# Patient Record
Sex: FEMALE | Race: OTHER | URBAN - METROPOLITAN AREA
[De-identification: names, ages, dates, MRNs, and addresses within clinical notes are randomized per-mention and may not be internally consistent; named-entity substitution may affect disease eponyms.]

---
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
## 2024-01-12 LAB — RPR, EXTERNAL RESULT: NON REACTIVE

## 2024-02-28 LAB — GBS, EXTERNAL RESULT: NEGATIVE

## 2024-03-01 ENCOUNTER — ROUTINE PRENATAL (OUTPATIENT)
Age: 19
End: 2024-03-01
Payer: MEDICAID

## 2024-03-01 VITALS — HEART RATE: 77 BPM | DIASTOLIC BLOOD PRESSURE: 71 MMHG | SYSTOLIC BLOOD PRESSURE: 123 MMHG

## 2024-03-01 DIAGNOSIS — O09.899 HIGH RISK TEEN PREGNANCY, ANTEPARTUM: Primary | ICD-10-CM

## 2024-03-01 PROCEDURE — 99212 OFFICE O/P EST SF 10 MIN: CPT | Performed by: OBSTETRICS & GYNECOLOGY

## 2024-03-01 PROCEDURE — 76816 OB US FOLLOW-UP PER FETUS: CPT | Performed by: OBSTETRICS & GYNECOLOGY

## 2024-03-01 NOTE — PROCEDURES
PATIENT: WON GEIGERYSANDRA   -  : 2005   -  DOS:2024   -  INTERPRETING PROVIDER:Delisa Gaston,   Indication  ========     Screening    Method  ======    Transabdominal ultrasound examination. View: Sufficient    Pregnancy  =========    White pregnancy. Number of fetuses: 1    Dating  ======    LMP on: 2023  Cycle: regular cycle  GA by LMP 36 w + 6 d  DAVID by LMP: 3/23/2024  Ultrasound examination on: 3/1/2024  GA by U/S based upon: AC, BPD, Femur, HC  GA by U/S 36 w + 4 d  DAVID by U/S: 3/25/2024  Assigned: based on the LMP, selected on 2023  Assigned GA 36 w + 6 d  Assigned DAVID: 3/23/2024    Fetal Biometry  ============    Standard  BPD 88.2 mm 35w 5d 30% Hadlock  .4 mm 37w 3d 56% Eun  .2 mm 35w 6d 7% Hadlock  .0 mm 38w 1d 91% Hadlock  Femur 70.9 mm 36w 2d 35% Hadlock  Humerus 59.7 mm 34w 4d 20% Eun  EFW 3,141 g 37w 4d 64% Hadlock  EFW (lb) 6 lb  EFW (oz) 15 oz  EFW by: Hadlock (BPD-HC-AC-FL)  Head / Face / Neck  Nasal bone: present  Other Structures   bpm    General Evaluation  ==============    Cardiac activity present.  bpm. Fetal movements: visualized. Presentation: Cephalic  Placenta: Placental site: anterior, appropriate distance from the internal os  Umbilical cord: Cord vessels: 3 vessel cord. Insertion site: placental insertion normal  Amniotic fluid: Amount of AF: normal. MVP 5.6 cm. ADELE 12.5 cm. Q1 4.6 cm, Q2 2.3 cm, Q3 0.0 cm, Q4 5.6 cm    Fetal Anatomy  ===========    Face  Nasal bone: present  Heart / Thorax  Cardiac rhythm: regular (normal)  Diaphragm: normal  Stomach: normal  Kidneys: normal  Bladder: normal  Wants to know fetal sex: yes    Findings  =======    Viable White pregnancy at 36w 6d by clinical dates.  EFW is 3141 g at 64%, abdominal circumference at 91%.  Anatomy visualized as stated above.  Amniotic fluid is normal.  Placenta is anterior, appropriate distance from the internal os.  Cephalic

## 2024-03-20 ENCOUNTER — HOSPITAL ENCOUNTER (INPATIENT)
Facility: HOSPITAL | Age: 19
LOS: 3 days | Discharge: HOME | DRG: 560 | End: 2024-03-23
Attending: OBSTETRICS & GYNECOLOGY | Admitting: OBSTETRICS & GYNECOLOGY
Payer: COMMERCIAL

## 2024-03-20 ENCOUNTER — HOSPITAL ENCOUNTER (EMERGENCY)
Facility: HOSPITAL | Age: 19
Discharge: HOME | DRG: 560 | End: 2024-03-20
Payer: COMMERCIAL

## 2024-03-20 VITALS
DIASTOLIC BLOOD PRESSURE: 83 MMHG | TEMPERATURE: 98.3 F | HEART RATE: 87 BPM | RESPIRATION RATE: 18 BRPM | SYSTOLIC BLOOD PRESSURE: 133 MMHG

## 2024-03-20 LAB
ERYTHROCYTE [DISTWIDTH] IN BLOOD BY AUTOMATED COUNT: 13.1 % (ref 11.5–14.5)
HCT VFR BLD AUTO: 42.3 % (ref 35–47)
HGB BLD-MCNC: 14.3 G/DL (ref 11.5–16)
MCH RBC QN AUTO: 30.5 PG (ref 26–34)
MCHC RBC AUTO-ENTMCNC: 33.8 G/DL (ref 30–36.5)
MCV RBC AUTO: 90.2 FL (ref 80–99)
NRBC # BLD: 0 K/UL (ref 0–0.01)
NRBC BLD-RTO: 0 PER 100 WBC
PLATELET # BLD AUTO: 234 K/UL (ref 150–400)
PMV BLD AUTO: 11.2 FL (ref 8.9–12.9)
RBC # BLD AUTO: 4.69 M/UL (ref 3.8–5.2)
WBC # BLD AUTO: 12.1 K/UL (ref 3.6–11)

## 2024-03-20 PROCEDURE — 4500000002 HC ER NO CHARGE

## 2024-03-20 PROCEDURE — 86900 BLOOD TYPING SEROLOGIC ABO: CPT

## 2024-03-20 PROCEDURE — 86850 RBC ANTIBODY SCREEN: CPT

## 2024-03-20 PROCEDURE — 36415 COLL VENOUS BLD VENIPUNCTURE: CPT

## 2024-03-20 PROCEDURE — 85027 COMPLETE CBC AUTOMATED: CPT

## 2024-03-20 PROCEDURE — 86901 BLOOD TYPING SEROLOGIC RH(D): CPT

## 2024-03-20 PROCEDURE — 7210000100 HC LABOR FEE PER 1 HR

## 2024-03-20 PROCEDURE — 1100000000 HC RM PRIVATE

## 2024-03-20 PROCEDURE — 99283 EMERGENCY DEPT VISIT LOW MDM: CPT

## 2024-03-20 RX ORDER — SODIUM CHLORIDE 9 MG/ML
25 INJECTION, SOLUTION INTRAVENOUS PRN
Status: DISCONTINUED | OUTPATIENT
Start: 2024-03-20 | End: 2024-03-23 | Stop reason: HOSPADM

## 2024-03-20 RX ORDER — SODIUM CHLORIDE, SODIUM LACTATE, POTASSIUM CHLORIDE, AND CALCIUM CHLORIDE .6; .31; .03; .02 G/100ML; G/100ML; G/100ML; G/100ML
1000 INJECTION, SOLUTION INTRAVENOUS PRN
Status: DISCONTINUED | OUTPATIENT
Start: 2024-03-20 | End: 2024-03-23 | Stop reason: HOSPADM

## 2024-03-20 RX ORDER — TERBUTALINE SULFATE 1 MG/ML
0.25 INJECTION, SOLUTION SUBCUTANEOUS
Status: ACTIVE | OUTPATIENT
Start: 2024-03-20 | End: 2024-03-21

## 2024-03-20 RX ORDER — NALOXONE HYDROCHLORIDE 0.4 MG/ML
INJECTION, SOLUTION INTRAMUSCULAR; INTRAVENOUS; SUBCUTANEOUS PRN
Status: DISCONTINUED | OUTPATIENT
Start: 2024-03-20 | End: 2024-03-23 | Stop reason: HOSPADM

## 2024-03-20 RX ORDER — SODIUM CHLORIDE, SODIUM LACTATE, POTASSIUM CHLORIDE, AND CALCIUM CHLORIDE .6; .31; .03; .02 G/100ML; G/100ML; G/100ML; G/100ML
500 INJECTION, SOLUTION INTRAVENOUS PRN
Status: DISCONTINUED | OUTPATIENT
Start: 2024-03-20 | End: 2024-03-23 | Stop reason: HOSPADM

## 2024-03-20 RX ORDER — FENTANYL/BUPIVACAINE/NS/PF 2-1250MCG
1-15 PLASTIC BAG, INJECTION (ML) INJECTION CONTINUOUS
Status: DISCONTINUED | OUTPATIENT
Start: 2024-03-20 | End: 2024-03-23 | Stop reason: HOSPADM

## 2024-03-20 RX ORDER — EPHEDRINE SULFATE 50 MG/ML
10 INJECTION INTRAVENOUS
Status: DISPENSED | OUTPATIENT
Start: 2024-03-20 | End: 2024-03-21

## 2024-03-20 RX ORDER — DIPHENHYDRAMINE HCL 25 MG
25 CAPSULE ORAL EVERY 4 HOURS PRN
Status: DISCONTINUED | OUTPATIENT
Start: 2024-03-20 | End: 2024-03-23 | Stop reason: HOSPADM

## 2024-03-20 RX ORDER — ACETAMINOPHEN 325 MG/1
650 TABLET ORAL EVERY 4 HOURS PRN
Status: DISCONTINUED | OUTPATIENT
Start: 2024-03-20 | End: 2024-03-23 | Stop reason: HOSPADM

## 2024-03-20 RX ORDER — ONDANSETRON 2 MG/ML
4 INJECTION INTRAMUSCULAR; INTRAVENOUS EVERY 6 HOURS PRN
Status: DISCONTINUED | OUTPATIENT
Start: 2024-03-20 | End: 2024-03-23 | Stop reason: HOSPADM

## 2024-03-20 RX ORDER — SODIUM CHLORIDE 0.9 % (FLUSH) 0.9 %
5-40 SYRINGE (ML) INJECTION PRN
Status: DISCONTINUED | OUTPATIENT
Start: 2024-03-20 | End: 2024-03-23 | Stop reason: HOSPADM

## 2024-03-20 RX ORDER — HYDROMORPHONE HYDROCHLORIDE 1 MG/ML
1 INJECTION, SOLUTION INTRAMUSCULAR; INTRAVENOUS; SUBCUTANEOUS
Status: DISCONTINUED | OUTPATIENT
Start: 2024-03-20 | End: 2024-03-23 | Stop reason: HOSPADM

## 2024-03-20 RX ORDER — SODIUM CHLORIDE 0.9 % (FLUSH) 0.9 %
5-40 SYRINGE (ML) INJECTION EVERY 12 HOURS SCHEDULED
Status: DISCONTINUED | OUTPATIENT
Start: 2024-03-20 | End: 2024-03-23 | Stop reason: HOSPADM

## 2024-03-20 NOTE — PROGRESS NOTES
3/20/2024  1:14 AM Patient arrived via wheelchair with report of contractions on and off since Sunday that have been stronger since 2230 on 3/19 and are now 10-15 minutes apart. Patient endorses positive fetal movement, denies leaking of fluid or vaginal bleeding. Patient denies pregnancy complications- is a patient of [facility] clinic and did not bring records.    0120: ENDY Orozco CNM at bedside SVE 2cm, plan for discharge. Labor precautions reviewed, SROM, vaginal bleeding, and decreased fetal movement. All questions answered. Patient has a follow up appointment today at [facility]. Patient encouraged to bring records the next time she comes to the hospital.   0148: Reviewed discharge instructions. Patient discharged home

## 2024-03-20 NOTE — ED PROVIDER NOTES
rhythm, normal S1 and S2, no S3 or S4, and no murmur noted  Abdomen:  No scars, normal bowel sounds, soft, non-distended, non-tender, no masses palpated, no hepatosplenomegally, gravid, soft to palpation  Fetal heart rate:  Baseline Heart Rate 125, accelerations:  present  long term variability:  moderate  decelerations:  absent  Reactive, cat 1    Cervix:    DILATION:  2 cm  EFFACEMENT:   75%  STATION:  -3 cm  CONSISTENCY:  medium  POSITION:  posterior  Vertex    Contraction frequency:  5-8 minutes, mild to palpation, resting tone soft  Membranes:  Intact    Clinical Impression:    Uterine contractions  Latent verses early labor  Pregnancy at 39w4d    Plan:    Admit to [facility]  NST  SVE    Discussed ctx pattern as ctx still spaced. Discussed returning to [facility] when contractions are every 5 minutes and more intense. Pt agreeable. Also reviewed LOF, VB and fetal movement precautions.   Pt has an appointment with [facility] today. Encouraged to keep appt and obtain prenatal records to bring to hospital when she returns in labor. Pt verbalized understanding.   Return to [facility] PRN  Discharged home

## 2024-03-21 LAB
ABO + RH BLD: NORMAL
BASOPHILS # BLD: 0 K/UL (ref 0–0.1)
BASOPHILS NFR BLD: 0 % (ref 0–1)
BLOOD GROUP ANTIBODIES SERPL: NORMAL
DIFFERENTIAL METHOD BLD: ABNORMAL
EOSINOPHIL # BLD: 0 K/UL (ref 0–0.4)
EOSINOPHIL NFR BLD: 0 % (ref 0–7)
ERYTHROCYTE [DISTWIDTH] IN BLOOD BY AUTOMATED COUNT: 13 % (ref 11.5–14.5)
HCT VFR BLD AUTO: 24.1 % (ref 35–47)
HGB BLD-MCNC: 8.5 G/DL (ref 11.5–16)
IMM GRANULOCYTES # BLD AUTO: 0.1 K/UL (ref 0–0.04)
IMM GRANULOCYTES NFR BLD AUTO: 1 % (ref 0–0.5)
LYMPHOCYTES # BLD: 1.2 K/UL (ref 0.8–3.5)
LYMPHOCYTES NFR BLD: 6 % (ref 12–49)
MCH RBC QN AUTO: 31.3 PG (ref 26–34)
MCHC RBC AUTO-ENTMCNC: 35.3 G/DL (ref 30–36.5)
MCV RBC AUTO: 88.6 FL (ref 80–99)
MONOCYTES # BLD: 1 K/UL (ref 0–1)
MONOCYTES NFR BLD: 5 % (ref 5–13)
NEUTS SEG # BLD: 18.5 K/UL (ref 1.8–8)
NEUTS SEG NFR BLD: 89 % (ref 32–75)
NRBC # BLD: 0 K/UL (ref 0–0.01)
NRBC BLD-RTO: 0 PER 100 WBC
PLATELET # BLD AUTO: 173 K/UL (ref 150–400)
PMV BLD AUTO: 10.5 FL (ref 8.9–12.9)
RBC # BLD AUTO: 2.72 M/UL (ref 3.8–5.2)
SPECIMEN EXP DATE BLD: NORMAL
WBC # BLD AUTO: 20.8 K/UL (ref 3.6–11)

## 2024-03-21 PROCEDURE — 2580000003 HC RX 258: Performed by: OBSTETRICS & GYNECOLOGY

## 2024-03-21 PROCEDURE — 2580000003 HC RX 258: Performed by: MIDWIFE

## 2024-03-21 PROCEDURE — 7220000101 HC DELIVERY VAGINAL/SINGLE

## 2024-03-21 PROCEDURE — 6370000000 HC RX 637 (ALT 250 FOR IP): Performed by: MIDWIFE

## 2024-03-21 PROCEDURE — 2500000003 HC RX 250 WO HCPCS: Performed by: MIDWIFE

## 2024-03-21 PROCEDURE — 7210000100 HC LABOR FEE PER 1 HR

## 2024-03-21 PROCEDURE — 6360000002 HC RX W HCPCS: Performed by: OBSTETRICS & GYNECOLOGY

## 2024-03-21 PROCEDURE — 6360000002 HC RX W HCPCS

## 2024-03-21 PROCEDURE — 88307 TISSUE EXAM BY PATHOLOGIST: CPT

## 2024-03-21 PROCEDURE — 10D17Z9 MANUAL EXTRACTION OF PRODUCTS OF CONCEPTION, RETAINED, VIA NATURAL OR ARTIFICIAL OPENING: ICD-10-PCS | Performed by: OBSTETRICS & GYNECOLOGY

## 2024-03-21 PROCEDURE — 0KQM0ZZ REPAIR PERINEUM MUSCLE, OPEN APPROACH: ICD-10-PCS | Performed by: OBSTETRICS & GYNECOLOGY

## 2024-03-21 PROCEDURE — 36415 COLL VENOUS BLD VENIPUNCTURE: CPT

## 2024-03-21 PROCEDURE — 85025 COMPLETE CBC W/AUTO DIFF WBC: CPT

## 2024-03-21 PROCEDURE — 1120000000 HC RM PRIVATE OB

## 2024-03-21 RX ORDER — FENTANYL CITRATE 50 UG/ML
INJECTION, SOLUTION INTRAMUSCULAR; INTRAVENOUS
Status: COMPLETED
Start: 2024-03-21 | End: 2024-03-21

## 2024-03-21 RX ORDER — SODIUM CHLORIDE, SODIUM LACTATE, POTASSIUM CHLORIDE, CALCIUM CHLORIDE 600; 310; 30; 20 MG/100ML; MG/100ML; MG/100ML; MG/100ML
INJECTION, SOLUTION INTRAVENOUS CONTINUOUS
Status: DISCONTINUED | OUTPATIENT
Start: 2024-03-21 | End: 2024-03-23 | Stop reason: HOSPADM

## 2024-03-21 RX ORDER — MISOPROSTOL 200 UG/1
TABLET ORAL
Status: DISPENSED
Start: 2024-03-21 | End: 2024-03-22

## 2024-03-21 RX ORDER — IBUPROFEN 400 MG/1
800 TABLET ORAL EVERY 8 HOURS
Status: CANCELLED | OUTPATIENT
Start: 2024-03-22

## 2024-03-21 RX ORDER — CARBOPROST TROMETHAMINE 250 UG/ML
250 INJECTION, SOLUTION INTRAMUSCULAR PRN
Status: DISCONTINUED | OUTPATIENT
Start: 2024-03-21 | End: 2024-03-23 | Stop reason: HOSPADM

## 2024-03-21 RX ORDER — METHYLERGONOVINE MALEATE 0.2 MG/ML
200 INJECTION INTRAVENOUS PRN
Status: DISCONTINUED | OUTPATIENT
Start: 2024-03-21 | End: 2024-03-23 | Stop reason: HOSPADM

## 2024-03-21 RX ORDER — SODIUM CHLORIDE 0.9 % (FLUSH) 0.9 %
5-40 SYRINGE (ML) INJECTION PRN
Status: CANCELLED | OUTPATIENT
Start: 2024-03-21

## 2024-03-21 RX ORDER — SODIUM CHLORIDE 0.9 % (FLUSH) 0.9 %
5-40 SYRINGE (ML) INJECTION EVERY 12 HOURS SCHEDULED
Status: CANCELLED | OUTPATIENT
Start: 2024-03-21

## 2024-03-21 RX ORDER — KETOROLAC TROMETHAMINE 30 MG/ML
30 INJECTION, SOLUTION INTRAMUSCULAR; INTRAVENOUS EVERY 6 HOURS
Status: CANCELLED | OUTPATIENT
Start: 2024-03-21 | End: 2024-03-22

## 2024-03-21 RX ORDER — OXYCODONE HYDROCHLORIDE 5 MG/1
10 TABLET ORAL EVERY 4 HOURS PRN
Status: CANCELLED | OUTPATIENT
Start: 2024-03-21

## 2024-03-21 RX ORDER — SODIUM CHLORIDE 9 MG/ML
INJECTION, SOLUTION INTRAVENOUS PRN
Status: CANCELLED | OUTPATIENT
Start: 2024-03-21

## 2024-03-21 RX ORDER — ACETAMINOPHEN 500 MG
1000 TABLET ORAL EVERY 8 HOURS SCHEDULED
Status: CANCELLED | OUTPATIENT
Start: 2024-03-21

## 2024-03-21 RX ORDER — ONDANSETRON 4 MG/1
4 TABLET, ORALLY DISINTEGRATING ORAL EVERY 6 HOURS PRN
Status: DISCONTINUED | OUTPATIENT
Start: 2024-03-21 | End: 2024-03-23 | Stop reason: HOSPADM

## 2024-03-21 RX ORDER — TRANEXAMIC ACID 100 MG/ML
INJECTION, SOLUTION INTRAVENOUS
Status: DISPENSED
Start: 2024-03-21 | End: 2024-03-22

## 2024-03-21 RX ORDER — MODIFIED LANOLIN
OINTMENT (GRAM) TOPICAL PRN
Status: CANCELLED | OUTPATIENT
Start: 2024-03-21

## 2024-03-21 RX ORDER — ONDANSETRON 2 MG/ML
4 INJECTION INTRAMUSCULAR; INTRAVENOUS EVERY 6 HOURS PRN
Status: DISCONTINUED | OUTPATIENT
Start: 2024-03-21 | End: 2024-03-21 | Stop reason: SDUPTHER

## 2024-03-21 RX ORDER — OXYCODONE HYDROCHLORIDE 5 MG/1
5 TABLET ORAL EVERY 4 HOURS PRN
Status: CANCELLED | OUTPATIENT
Start: 2024-03-21

## 2024-03-21 RX ORDER — FENTANYL CITRATE 50 UG/ML
100 INJECTION, SOLUTION INTRAMUSCULAR; INTRAVENOUS ONCE
Status: COMPLETED | OUTPATIENT
Start: 2024-03-21 | End: 2024-03-21

## 2024-03-21 RX ORDER — IBUPROFEN 400 MG/1
800 TABLET ORAL EVERY 8 HOURS SCHEDULED
Status: CANCELLED | OUTPATIENT
Start: 2024-03-21 | End: 2024-03-24

## 2024-03-21 RX ORDER — MISOPROSTOL 200 UG/1
400 TABLET ORAL PRN
Status: DISCONTINUED | OUTPATIENT
Start: 2024-03-21 | End: 2024-03-23 | Stop reason: HOSPADM

## 2024-03-21 RX ORDER — LIDOCAINE HYDROCHLORIDE 10 MG/ML
INJECTION, SOLUTION INFILTRATION; PERINEURAL
Status: DISPENSED
Start: 2024-03-21 | End: 2024-03-22

## 2024-03-21 RX ADMIN — WATER 2000 MG: 1 INJECTION INTRAMUSCULAR; INTRAVENOUS; SUBCUTANEOUS at 23:01

## 2024-03-21 RX ADMIN — WATER 2000 MG: 1 INJECTION INTRAMUSCULAR; INTRAVENOUS; SUBCUTANEOUS at 14:30

## 2024-03-21 RX ADMIN — TRANEXAMIC ACID 1000 MG: 100 INJECTION, SOLUTION INTRAVENOUS at 13:48

## 2024-03-21 RX ADMIN — SODIUM CHLORIDE 25 ML: 9 INJECTION, SOLUTION INTRAVENOUS at 21:14

## 2024-03-21 RX ADMIN — GENTAMICIN SULFATE 300 MG: 40 INJECTION, SOLUTION INTRAMUSCULAR; INTRAVENOUS at 21:13

## 2024-03-21 RX ADMIN — FENTANYL CITRATE 100 MCG: 50 INJECTION INTRAMUSCULAR; INTRAVENOUS at 13:33

## 2024-03-21 RX ADMIN — FENTANYL CITRATE 100 MCG: 50 INJECTION, SOLUTION INTRAMUSCULAR; INTRAVENOUS at 13:33

## 2024-03-21 RX ADMIN — SODIUM CHLORIDE, POTASSIUM CHLORIDE, SODIUM LACTATE AND CALCIUM CHLORIDE: 600; 310; 30; 20 INJECTION, SOLUTION INTRAVENOUS at 03:21

## 2024-03-21 RX ADMIN — SODIUM CHLORIDE, POTASSIUM CHLORIDE, SODIUM LACTATE AND CALCIUM CHLORIDE 1000 ML: 600; 310; 30; 20 INJECTION, SOLUTION INTRAVENOUS at 15:02

## 2024-03-21 RX ADMIN — MISOPROSTOL 600 MCG: 200 TABLET ORAL at 13:48

## 2024-03-21 RX ADMIN — SODIUM CHLORIDE, SODIUM LACTATE, POTASSIUM CHLORIDE, AND CALCIUM CHLORIDE 500 ML: .6; .31; .03; .02 INJECTION, SOLUTION INTRAVENOUS at 03:22

## 2024-03-21 NOTE — L&D DELIVERY NOTE
Patient pushed for a short time, around 30 minutes. Baby boy born in OA presentation. Shoulders and body easily followed. Baby placed on mom's chest. True knot in the cord! Cord clamped and cut by family member after it ceased pulsating. Placenta had not delivered after 30 minutes so patient given fentanyl and placenta removed manually. Inspected and intact. Quite calcified so sent to pathology. Uterus immediately boggy after removal of placenta with lots of clots. Firmed up with pitocin, buccal cytotec and TXA. Second degree laceration repaired with 3.0 vicryl. At the end a little trickle noted and a few more clots removed from vagina. Bleeding stable after that.    Ricco Quinn [[id number]]      Labor Events     Labor: No  Cervical Ripening Date/Time:      Antibiotics Received during Labor: No  Rupture Date/Time:      Rupture Type: SROM, Intact  Fluid Color: Meconium  Meconium Consistency: Thin  Fluid Odor: None  Induction: None  Augmentation: None  Labor Complications: None              Anesthesia    Method: None       Labor Event Times      Labor onset date/time:        Dilation complete date/time:  3/21/24 12:42:00     Start pushing date/time:  3/21/2024 12:45:00   Decision date/time (emergent ):            Labor Length    2nd stage: 0h 18m  3rd stage: 0h 38m       Delivery Details      Delivery Date: 3/21/24 Delivery Time: 13:00:00   Delivery Type: Vaginal, Spontaneous              [place] Presentation    Presentation: Vertex  Position: Middle       Shoulder Dystocia    Shoulder Dystocia Present?: No       Assisted Delivery Details    Forceps Attempted?: No  Vacuum Extractor Attempted?: No                           Cord    Vessels: 3 Vessels  Complications: Knot  Delayed Cord Clamping?: Yes  Cord Clamped Date/Time: 3/21/2024 13:02:00  Cord Blood Disposition: Discard  Gases Sent?: No              Placenta    Date/Time: 3/21/2024 13:38:40  Removal: Manual Removal  Appearance:

## 2024-03-21 NOTE — H&P
Labor and Delivery History & Physical  3/20/2024    Patient is a 18 y.o.  @ 39w4d with irish OF 3/23/24 admitted from [facility] with advanced dilation and contractions. She was seen last night and d/c'd to home after no change in cervix. This evening, she reports good fetal movement and denies vaginal bleeding, leaking of fluid, nausea/vomiting/diarrhea, fever, cough, or sore throat. She also denies any SOB, loss of taste/smell, exposure to anyone with COVID, in addition to headache, changes in vision, or RUQ/epigastric pain. She also denies dysuria, urgency, frequency, vaginal discharge, burning, itching, and odor. She reports regular prenatal care with [facility] and denies any issues with this pregnancy. GBS neg. No anatomy scan for review, pt. States WNL. All remaining records reviewed.      PNC: Blood type: O            RH: pos            Hep B: JOAN            Rubella: immune            GBS status: NEG            HIV: NEG            RPR/TPA/VDRL: NR            GC/CT: NEG            HSV serology: not noted on prenatal records, but patient denies any hx/sxs    OBHX:   OB History          1    Para        Term                AB        Living             SAB        IAB        Ectopic        Molar        Multiple        Live Births                    PMH: No past medical history on file.    PSH: No past surgical history on file.    Medications:   Prior to Admission Medications   Prescriptions Last Dose Informant Patient Reported? Taking?   Prenatal Vit w/Gf-[id number] (PNV PO)   Yes No   Sig: Take by mouth      Facility-Administered Medications: None       Allergies: No Known Allergies    Pertinent ROS: Review of Systems - Negative except Pregnancy, contractions    Family Hx: No family history on file.    Social Hs:   Social History     Socioeconomic History    Marital status: Single     Spouse name: Not on file    Number of children: Not on file    Years of education:  Not on file    Highest education level: Not on file   Occupational History    Not on file   Tobacco Use    Smoking status: Never    Smokeless tobacco: Never   Vaping Use    Vaping Use: Never used   Substance and Sexual Activity    Alcohol use: Never    Drug use: Never    Sexual activity: Not on file   Other Topics Concern    Not on file   Social History Narrative    Not on file     Social Determinants of Health     Financial Resource Strain: Not on file   Food Insecurity: Not on file   Transportation Needs: Not on file   Physical Activity: Not on file   Stress: Not on file   Social Connections: Not on file   Intimate Partner Violence: Not on file   Housing Stability: Not on file       OBJECTIVE:  /85   Pulse 97   Temp 98.1 °F (36.7 °C) (Oral)   Resp 16   LMP 06/17/2023     FHR baseline 140. 15x15 Accels present. No decels present   Ctx: TOCO: q to palpation. Appropriate resting tone. No tachysystole noted     Exam:  General: alert & oriented x3  HEENT:  normal   Lungs:  unlabored breathing  Cor:  RRR  Abdomen:  Soft, non-tender                    S=D                    Clinical EFW 3750gms   Cervix:  5-6/100-0, vertex  Membranes:  Intact   Pelvimetry:  AP-good                      Arch- adequate                      Sidewalls- adequate                      Pelvis appears adequate   Extremities:  normal, no edema       Impression: IUP at 39w4d. Advanced dilation, uterine contractions  Cat I tracing  GBS neg     Plan:  Admit  Review and sign consents  CBC, Type & Screen   Continuous monitoring and/or intermittent monitoring per protocol  Reviewed plan with patient/partner, all questions answered     SHABBIR Ayoub CNM  10:52 PM

## 2024-03-21 NOTE — PROGRESS NOTES
In room to evaluate. In bed with shakes. Family at bs. Reports increase in pain with contractions.     VE: 5-6/100/1. Labia moist and appears to be light mec on glove. RN staff reports smelling amniotic fluid earlier with re-positioning. Will call SROM at that time.     Still does not plan epidural     May consider Pitocin for more regular contractions.

## 2024-03-21 NOTE — PROGRESS NOTES
2226. Pt arrived from home c/o contractions every 10-15min. Pt states she came last night and was 2cm.  Pt followed up with OB today and was checked and was 4cm. Pt came back tonight due to irregular contractions.     2233. Nan nolen cnm notified of pt status, will be at bedside to assess pt.     2239. Sve by nan palomino and pt 5cm , 100% and 0 station. Pt tolerated well. Pt to be admitted to L&D.    2245. Iv placed by yana Brewer rn.     2255. TRANSFER - IN REPORT:    Verbal report received from yana Brewer rn on Corbin Jeffersoniola  being received from [facility] for routine progression of patient care      Report consisted of patient's Situation, Background, Assessment and   Recommendations(SBAR).     Information from the following report(s) Nurse Handoff Report was reviewed with the receiving nurse.    Opportunity for questions and clarification was provided.      Assessment completed upon patient's arrival to unit and care assumed.      Pt admitted to L&D room 3 with mom and sister at side.     2331. Bedside and Verbal shift change report given to chitra Abrams rn (oncoming nurse) by yana Brewer rn (offgoing nurse). Report included the following information Nurse Handoff Report.

## 2024-03-21 NOTE — PROGRESS NOTES
0125 - OBSBAR received from LENORE Reyes  5421 - Bedside and Verbal shift change report given to LENORE Adair (oncoming nurse) by LENORE Carrasquillo (offgoing nurse). Report included the following information Nurse Handoff Report, MAR, and Recent Results.

## 2024-03-21 NOTE — ED PROVIDER NOTES
HPI Patient is a 18 y.o.  @ 39w4d with irish OF 3/23/24 presents to the [facility] at 2226 reporting ongoing contractions. She was seen here last night in [facility] and d/c'd to home after no cervical change. (Was 2cms). Had clinic appt. Today and was 4cms. This evening, she reports good fetal movement and denies vaginal bleeding, leaking of fluid, nausea/vomiting/diarrhea, fever, cough, or sore throat. She also denies any SOB, loss of taste/smell, exposure to anyone with COVID, in addition to headache, changes in vision, or RUQ/epigastric pain. She also denies dysuria, urgency, frequency, vaginal discharge, burning, itching, and odor. She reports regular prenatal care with [facility] and denies any issues with this pregnancy. GBS neg. No anatomy scan for review, pt. States WNL. All remaining records reviewed.        No chief complaint on file.      No past medical history on file.    No past surgical history on file.      No family history on file.    Social History     Socioeconomic History    Marital status: Single     Spouse name: Not on file    Number of children: Not on file    Years of education: Not on file    Highest education level: Not on file   Occupational History    Not on file   Tobacco Use    Smoking status: Never    Smokeless tobacco: Never   Vaping Use    Vaping Use: Never used   Substance and Sexual Activity    Alcohol use: Never    Drug use: Never    Sexual activity: Not on file   Other Topics Concern    Not on file   Social History Narrative    Not on file     Social Determinants of Health     Financial Resource Strain: Not on file   Food Insecurity: Not on file   Transportation Needs: Not on file   Physical Activity: Not on file   Stress: Not on file   Social Connections: Not on file   Intimate Partner Violence: Not on file   Housing Stability: Not on file       ALLERGIES: Patient has no known allergies.    Review of Systems   Genitourinary:         Pregnancy  Uterine

## 2024-03-21 NOTE — PROGRESS NOTES
Patient has been persistently tachycardic since birth. QBL 2500.  Hgb 14.3 --> 8.5  Temp 101  No active bleeding and fundus firm at umbilicus per nursing staff (the checks are uncomfortable and she's had multiple so I don't want to repeat it).    Temp could have been from cytotec but based on how placenta looked and white count now elevated to 20 could be infection in addition to acute volume depletion.    Will continue ancef x24h and add a dose of gent.    Check CBC in AM.    Ok to go to ostpartum.    Vitals:    03/21/24 1654 03/21/24 1709 03/21/24 1751 03/21/24 1837   BP: (!) 103/59 (!) 110/59     Pulse: (!) 134 (!) 134     Resp:       Temp:   (!) 101 °F (38.3 °C)    TempSrc:   Oral    SpO2: 97% 100%     Weight:    68.9 kg (152 lb)   Height:    1.651 m (5' 5\")

## 2024-03-21 NOTE — PROGRESS NOTES
0745: Bedside and Verbal shift change report given to ANAID Elizabeth RN (oncoming nurse) by DEJAH Chow RN (offgoing nurse). Report included the following information Nurse Handoff Report, Adult Overview, MAR, Recent Results, and Event Log.    0804: Pt ambulating in room/on and off birthing ball. VSS. Assessment complete. Discussed pain management options.    0945: Pt called for RN at the bedside. Feeling pressure and increased pain w/ contractions. SVE 7-8/C/+1, ANAID Elizabeth RN. Pt denies epidural at this time. SROM, time unknown, moderate mec on pad and w/ SVE.     1010: Dr. Zelaya at the bedside to check in on pt progress. Pt questions answered. Pt will call RN when feeling increased pressure.     1054: Pt on portable EFM.  w/ moderate variability. Pt ambulating in room and coping w/ contractions.     1111: Pt called RN to bedside. Bloody show and light mec noted. No change in SVE.     1200: Dr. Zelaya at the bedside to assess pt progress. SVE 9/100/+2.    1245: Pt started pushing.    1252: Dr. Zelaya at the bedside.    1300:  of live baby boy!    1333: Fentanyl 100mcg given (see MAR).    1338: Manual removal of placenta.     1348: Cytotec 600mcg given & TXA 100mcg given (see MAR).    1430: ANCEF 2000mg given (see MAR).    1454: Dr. Zelaya notified of QBL & vitals. Fluid bolus 1,000 per MD.     1509: RN and charge RN at bedside to assess bleeding. Scant bleeding w/small clot.    1650: Dr. Zelaya notified of pt status. Stat CBC, per MD. Fluid bolus 500.      1830: Pt up to bedside commode. Pt feeling nauseas and faint. Pt back in bed. Pads/gown changed.     1900: TRANSFER - OUT REPORT:    Verbal report given to EDILBERTO Mckay RN on Corbin Benedict  being transferred to [facility] for routine progression of patient care       Report consisted of patient's Situation, Background, Assessment and   Recommendations(SBAR).     Information from the following report(s) Nurse Handoff Report, Adult Overview,

## 2024-03-22 LAB
BASOPHILS # BLD: 0 K/UL (ref 0–0.1)
BASOPHILS NFR BLD: 0 % (ref 0–1)
DIFFERENTIAL METHOD BLD: ABNORMAL
EOSINOPHIL # BLD: 0 K/UL (ref 0–0.4)
EOSINOPHIL NFR BLD: 0 % (ref 0–7)
ERYTHROCYTE [DISTWIDTH] IN BLOOD BY AUTOMATED COUNT: 13.2 % (ref 11.5–14.5)
HCT VFR BLD AUTO: 20.8 % (ref 35–47)
HGB BLD-MCNC: 7 G/DL (ref 11.5–16)
IMM GRANULOCYTES # BLD AUTO: 0.1 K/UL (ref 0–0.04)
IMM GRANULOCYTES NFR BLD AUTO: 1 % (ref 0–0.5)
LYMPHOCYTES # BLD: 3.4 K/UL (ref 0.8–3.5)
LYMPHOCYTES NFR BLD: 22 % (ref 12–49)
MCH RBC QN AUTO: 31 PG (ref 26–34)
MCHC RBC AUTO-ENTMCNC: 33.7 G/DL (ref 30–36.5)
MCV RBC AUTO: 92 FL (ref 80–99)
MONOCYTES # BLD: 1.5 K/UL (ref 0–1)
MONOCYTES NFR BLD: 9 % (ref 5–13)
NEUTS SEG # BLD: 10.7 K/UL (ref 1.8–8)
NEUTS SEG NFR BLD: 68 % (ref 32–75)
NRBC # BLD: 0 K/UL (ref 0–0.01)
NRBC BLD-RTO: 0 PER 100 WBC
PLATELET # BLD AUTO: 149 K/UL (ref 150–400)
PMV BLD AUTO: 10.7 FL (ref 8.9–12.9)
RBC # BLD AUTO: 2.26 M/UL (ref 3.8–5.2)
WBC # BLD AUTO: 15.6 K/UL (ref 3.6–11)

## 2024-03-22 PROCEDURE — 6360000002 HC RX W HCPCS: Performed by: OBSTETRICS & GYNECOLOGY

## 2024-03-22 PROCEDURE — 36415 COLL VENOUS BLD VENIPUNCTURE: CPT

## 2024-03-22 PROCEDURE — 2580000003 HC RX 258: Performed by: OBSTETRICS & GYNECOLOGY

## 2024-03-22 PROCEDURE — 85025 COMPLETE CBC W/AUTO DIFF WBC: CPT

## 2024-03-22 PROCEDURE — 1120000000 HC RM PRIVATE OB

## 2024-03-22 RX ADMIN — WATER 2000 MG: 1 INJECTION INTRAMUSCULAR; INTRAVENOUS; SUBCUTANEOUS at 06:21

## 2024-03-22 NOTE — PROGRESS NOTES
2130: assisted patient to bathroom, patient felt fine on walk to bathroom. Patient squatted at toilet to void and when standing up patient became dizzy and legs became weak. This nurse sat patient back on toilet, waved ammonia pack under patient's nose, and pulled emergency cord. RN came in with wheelchair and patient felt better from sitting down. This nurse assisted patient into wheelchair and then wheeled to bedside and assisted into bed. Patient states she felt better now. Educated patient about needing to fully sit down on toilet to void so she did not get that sensation again when standing and to stand slowly. Told patient to call for help again next time she needed to get up to bathroom. Patient's fundus is firm -1 midline with small bleeding. Primary RN notified of event.

## 2024-03-22 NOTE — PROGRESS NOTES
Post Partum Day #1- Vaginal delivery    Patient doing well post-partum without significant complaint.  Voiding without difficulty, normal lochia.    Vital signs stable, afebrile.    Exam:  Patient without distress.               Abdomen soft, fundus firm at level of umbilicus, nontender               Perineum - intact with normal lochia               Lower extremities- mild edema but negative Samantha's sign   Breasts- not engorged    Labs: ew Results - Lab  Sorted by update time  Updated   Order   03/22/24 0604  CBC with Auto Differential  Collected: 03/22/24 0505  Final result  Specimen: Blood from WHOLE BLOOD    WBC 15.6 High  K/uL Lymphocytes % 22 %   RBC 2.26 Low  M/uL Monocytes % 9 %   Hemoglobin 7.0 Low  g/dL Eosinophils % 0 %   Hematocrit 20.8 Low  % Basophils % 0 %   MCV 92.0 FL Immature Granulocytes 1 High  %   MCH 31.0 PG Neutrophils Absolute 10.7 High  K/UL   MCHC 33.7 g/dL Lymphocytes Absolute 3.4 K/UL   RDW 13.2 % Monocytes Absolute 1.5 High  K/UL   Platelets 149 Low  K/uL Eosinophils Absolute 0.0 K/UL   MPV 10.7 FL Basophils Absolute 0.0 K/UL   Nucleated RBCs 0.0  WBC Absolute Immature Granulocyte 0.1 High  K/UL   nRBC 0.00 K/uL Differential Type AUTOMATED     Neutrophils % 68 %            03/21/24 1746  CBC with Auto Differential  Collected: 03/21/24 1714  Final result  Specimen: Blood from WHOLE BLOOD    WBC 20.8 High  K/uL Lymphocytes % 6 Low  %   RBC 2.72 Low  M/uL Monocytes % 5 %   Hemoglobin 8.5 Low  g/dL  Eosinophils % 0 %   Hematocrit 24.1 Low  % Basophils % 0 %   MCV 88.6 FL Immature Granulocytes 1 High  %   MCH 31.3 PG Neutrophils Absolute 18.5 High  K/UL   MCHC 35.3 g/dL Lymphocytes Absolute 1.2 K/UL   RDW 13.0 % Monocytes Absolute 1.0 K/UL   Platelets 173 K/uL Eosinophils Absolute 0.0 K/UL   MPV 10.5 FL Basophils Absolute 0.0 K/UL   Nucleated RBCs 0.0  WBC Absolute Immature Granulocyte 0.1 High  K/UL   nRBC 0.00 K/uL Differential Type AUTOMATED     Neutrophils % 89 High  %             24 0734  TYPE AND SCREEN  Collected: 24 7236  Final result  Specimen: Blood Plasma      Crossmatch expiration date 2024,3536 Antibody Screen NEG   ABO/Rh O POSITIVE            Impression: PPD #1 S/P                      PPH S/P 2500 cc QBL                    Currently Asymptomatic    Assessment and Plan:  Patient appears to be having uncomplicated post-partum course.  Continue routine perineal care and maternal education.  Plan discharge tomorrow if no problems occur. Blood type checked.     Everton Causey MD

## 2024-03-23 VITALS
HEIGHT: 65 IN | HEART RATE: 84 BPM | DIASTOLIC BLOOD PRESSURE: 64 MMHG | SYSTOLIC BLOOD PRESSURE: 110 MMHG | RESPIRATION RATE: 14 BRPM | WEIGHT: 152 LBS | TEMPERATURE: 98.6 F | BODY MASS INDEX: 25.33 KG/M2 | OXYGEN SATURATION: 99 %

## 2024-03-23 RX ORDER — FERROUS SULFATE 325(65) MG
325 TABLET ORAL 2 TIMES DAILY
Qty: 60 TABLET | Refills: 0 | Status: SHIPPED | OUTPATIENT
Start: 2024-03-23

## 2024-03-23 RX ORDER — IBUPROFEN 600 MG/1
600 TABLET ORAL 4 TIMES DAILY PRN
Qty: 40 TABLET | Refills: 0 | Status: SHIPPED | OUTPATIENT
Start: 2024-03-23

## 2024-03-23 NOTE — PROGRESS NOTES
Post-Partum Day Number 2 Progress Note    Patient doing well post-partum without significant complaints.  Voiding without difficulty, normal lochia and pain reports no pain. She denies any symptoms of anemia: no dizziness, lightheadedness, or palpitations. Breast and formula bottle feeding. The patient declined circ for the baby and she desires discharge home today.    Vitals:  Patient Vitals for the past 24 hrs:   BP Temp Temp src Pulse Resp SpO2   24 0300 106/60 98.7 °F (37.1 °C) Oral 90 16 100 %   24 2030 (!) 103/55 99.4 °F (37.4 °C) Oral (!) 112 16 100 %   24 1440 101/64 98.7 °F (37.1 °C) Oral 70 16 100 %     Temp (24hrs), Av.9 °F (37.2 °C), Min:98.7 °F (37.1 °C), Max:99.4 °F (37.4 °C)    Vital signs stable, afebrile.    Exam:  Patient without distress.               Abdomen soft, fundus firm, nontender               Lower extremities, CHRISTOPHER nontender w/o edema    Labs:   No results found for this or any previous visit (from the past 24 hour(s)).    Assessment and Plan:  PPD 2 s/p PPH 2350ml, stable, routine care; O+/RI/XY  1. Discharge today-instructions reviewed.    2. Anemia: FeSO4 daily at home.

## 2024-03-23 NOTE — DISCHARGE SUMMARY
Patient ID:  Corbin Benedict  [id number]  18 y.o.  2005    Admit Date: 3/20/2024    Discharge Date: 3/23/2024     Admitting Physician: Doyle Morfin MD  Attending Physician: Doyle Morfin MD    Admission Diagnoses:   Pregnancy at 39.5 weeks in active labor    Procedures for this admission: ; PPH 2350ml    Hospital Course: Normal  followed by PPH 2350ml QBL with anemia not requiring transfusion.    Discharge Diagnoses: Same as above with  producing a viable infant.  Information for the patient's :  Ricco Quinn [[id number]]          Discharge Disposition:  Home    Discharge Condition:  Stable    Additional Diagnoses:  PPH with anemia .     Maternal Labs: No components found for: \"OBEXTABORH\", \"OBEXTABSCRN\", \"OBEXTHBSAG\", \"OBEXTHIV\", \"OBEXTRUBELLA\", \"OBEXTRPR\", \"OBEXTGRBS\"    Cord Blood Results:   Information for the patient's :  Ricco Quinn [[id number]]     Lab Results   Component Value Date/Time    ABORH O POSITIVE 2024 01:17 PM    BILI IF DIRECT TI POSITIVE, BILIRUBIN TO FOLLOW 2024 01:17 PM            History of Present Illness:   OB History          1    Para   1    Term   1            AB        Living   1         SAB        IAB        Ectopic        Molar        Multiple   0    Live Births   1              Admitted for active labor.     Hospital Course:   Patient was admitted as above and delivered via  .  Please the chart for details.  The postpartum course was unremarkable.  She was deemed stable for discharge home on day 2.    Follow up with [facility] in 4-6 weeks        Signed:  Nick Magaña MD  3/23/2024  9:34 AM

## 2024-03-23 NOTE — DISCHARGE INSTRUCTIONS
Postpartum Support Groups  We know that all of us are dealing with a tremendous amount of uncertainty, confusion and disruption to our daily lives, which may result in increased anxiety, depression and fear. If you are feeling unsettled or worse, please know that we are here to help. During this time of increased caution and care for one another, [facility] [place] ([facility]) is offering virtual support groups to ALL MOTHERS in [place] regardless of the age of your child/children as a way to help weather this emotional storm together. Social support is an important part of self-care during this time of physical distancing.  Virtual postpartum support group meetings available at [email or web address]  Warm Line: [phone]

## 2025-04-11 ENCOUNTER — HOSPITAL ENCOUNTER (OUTPATIENT)
Facility: HOSPITAL | Age: 20
Setting detail: SPECIMEN
Discharge: HOME | End: 2025-04-14

## 2025-04-11 PROCEDURE — 87591 N.GONORRHOEAE DNA AMP PROB: CPT

## 2025-04-11 PROCEDURE — 36415 COLL VENOUS BLD VENIPUNCTURE: CPT

## 2025-04-11 PROCEDURE — 87491 CHLMYD TRACH DNA AMP PROBE: CPT

## 2025-04-14 LAB
C TRACH RRNA SPEC QL NAA+PROBE: NEGATIVE
N GONORRHOEA RRNA SPEC QL NAA+PROBE: NEGATIVE
SPECIMEN SOURCE: NORMAL

## 2025-06-24 ENCOUNTER — HOSPITAL ENCOUNTER (OUTPATIENT)
Facility: HOSPITAL | Age: 20
Setting detail: SPECIMEN
Discharge: HOME | End: 2025-06-27

## 2025-06-24 PROCEDURE — 88175 CYTOPATH C/V AUTO FLUID REDO: CPT
